# Patient Record
Sex: FEMALE | Race: WHITE | NOT HISPANIC OR LATINO
[De-identification: names, ages, dates, MRNs, and addresses within clinical notes are randomized per-mention and may not be internally consistent; named-entity substitution may affect disease eponyms.]

---

## 2017-11-12 ENCOUNTER — HEALTH MAINTENANCE LETTER (OUTPATIENT)
Age: 32
End: 2017-11-12

## 2018-07-04 ENCOUNTER — TRANSFERRED RECORDS (OUTPATIENT)
Dept: HEALTH INFORMATION MANAGEMENT | Facility: CLINIC | Age: 33
End: 2018-07-04

## 2018-07-05 ENCOUNTER — OFFICE VISIT (OUTPATIENT)
Dept: ORTHOPEDICS | Facility: OTHER | Age: 33
End: 2018-07-05
Payer: COMMERCIAL

## 2018-07-05 VITALS
BODY MASS INDEX: 24.48 KG/M2 | SYSTOLIC BLOOD PRESSURE: 104 MMHG | HEIGHT: 62 IN | WEIGHT: 133 LBS | DIASTOLIC BLOOD PRESSURE: 62 MMHG

## 2018-07-05 DIAGNOSIS — S50.11XA CONTUSION OF RIGHT FOREARM, INITIAL ENCOUNTER: Primary | ICD-10-CM

## 2018-07-05 PROCEDURE — 99213 OFFICE O/P EST LOW 20 MIN: CPT | Performed by: PHYSICAL MEDICINE & REHABILITATION

## 2018-07-05 NOTE — LETTER
"    7/5/2018         RE: Antonina Rosario  [address] [facility] [address]        Dear Colleague,    Thank you for referring your patient, Antonina Rosario, to the [facility]. Please see a copy of my visit note below.    Sports Medicine Clinic Visit    PCP: Yury Blanco    CC: Patient presents with:  Right Forearm - Edema, Pain      HPI:  Antonina Rosario is a 33 year old female who is seen as an ER referral.   She notes right dorsal mid-forearm an injury on 7/4/18 when she rolled a go cart and got her arm \"squished\" by the roll bar. Was seen at ER last night at [facility] in [place] where she received xrays and application of orthoglass splint. Was told nothing was broken and that is was likely soft tissue injury to extensor muscles and to follow-up with ortho. She rates the pain at a  10/10 at its worst and a 2/10 currently.  Symptoms are relieved with ice, Ibuprofen, rest and splint. Symptoms are worsened by finger movement, lifting, gripping, lifting. She endorses swelling, bruising and abraision.   She denies popping, grinding, catching, numbness, tingling and pain in other joints.  Other treatment has included ice, ibuprofen, rest and splint. She notes difficulty with lifting and general arm usage.       Review of Systems:  Musculoskeletal: as above  Remainder of review of systems is negative including constitutional, eyes, ENT, CV, pulmonary, GI, , endocrine, skin, hematologic, and neurologic except as noted in HPI or medical history.    History reviewed. No pertinent past surgical/medical/family/social history other than as mentioned in HPI.    Patient Active Problem List   Diagnosis     CARDIOVASCULAR SCREENING; LDL GOAL LESS THAN 160     Past Medical History:   Diagnosis Date     NO ACTIVE PROBLEMS      Past Surgical History:   Procedure Laterality Date     NO HISTORY OF SURGERY       Family History   Problem Relation Age of Onset     Cancer Paternal Grandmother      pancreatic " "    Social History     Social History     Marital status: Single     Spouse name: N/A     Number of children: N/A     Years of education: N/A     Occupational History      [employer]     Social History Main Topics     Smoking status: Current Every Day Smoker     Types: Cigarettes     Smokeless tobacco: Never Used      Comment: As of 4/2/09  reports she's smoking 4-5 cig/day     Alcohol use No     Drug use: No     Sexual activity: Yes     Partners: Male     Birth control/ protection: IUD     Other Topics Concern      Service No     Blood Transfusions No     Caffeine Concern Yes     Advised not more than 16 ounces/day     Occupational Exposure Yes          Hobby Hazards No     Sleep Concern No     Stress Concern No     Weight Concern No     Special Diet No     Back Care No     Exercise No     Advised walking 30 minutes/day     Seat Belt Yes     Parent/Sibling W/ Cabg, Mi Or Angioplasty Before 65f 55m? No     Social History Narrative    Lives in [place] with S.O., Timothy and son, Thai.  Timothy and Antonina both smoke.  No concerns about domestic violence.  No indoor cats/kittens.           Current Outpatient Prescriptions   Medication     IBUPROFEN PO     levonorgestrel (MIRENA) 20 MCG/24HR IUD     buPROPion (WELLBUTRIN SR) 100 MG 12 hr tablet     No current facility-administered medications for this visit.      Allergies   Allergen Reactions     Sulfa Drugs      Hives         Objective:  /62 (BP Location: Left arm, Patient Position: Sitting, Cuff Size: Adult Regular)  Ht 5' 2\" (1.575 m)  Wt 133 lb (60.3 kg)  BMI 24.33 kg/m2    General: Alert and in no distress    Head: Normocephalic, atraumatic  Eyes: no scleral icterus or conjunctival erythema   Oropharynx:  Mucous membranes moist  Skin: no erythema, petechiae, or jaundice  CV: regular rhythm by palpation, 2+ distal pulses  Resp: normal respiratory effort without conversational dyspnea   Psych: normal mood and affect    Neuro: Motor " strength and sensation as noted below    Musculoskeletal:    Bilateral Wrist and Hand exam    Inspection:  -Soft tissue swelling over the right forearm  -Abrasion (extensor surface) and mild bruising over the right forearm    Palpation:  -Tender over the right dorsal mid forearm.    ROM:  -Decreased and painful right wrist flexion and extension.  Mildly decreased right forearm extension    Strength:  Bilateral elbow flexion/extension, forearm supination/pronation,  strength, finger abduction 5/5.  Left wrist flexion/extension 4+/5.  Right wrist flexion/extension 5/5.    Neurovascular:       2+ radial pulses bilaterally      Altered sensation to light touch over the right dorsal mid forearm abrasion    Radiology:  Reviewed printed forearm x-ray from outside hospital which revealed no acute fractures.  Will also be obtaining results from the ER.      Assessment:  1. Contusion of right forearm, initial encounter        Plan:  Discussed the assessment with the patient and developed a plan together:  -No fractures seen on x-ray.  Suspect soft tissue injury of the extensor compartment of the forearm.    -Over the counter medication: Acetaminophen (Tylenol) 1000mg every 6 hours with food (Maximum of 3000mg/day)  Ibuprofen (Advil) maximum of 800mg three times a day with food  -Use splint and sling as needed--wean as tolerated  -Ice for 15-20 minutes multiple times a day for pain and swelling.  -Gentle pain-free wrist and elbow range of motion  -Antonina signed a release of information so we could obtain records from the ER.     -Follow up as needed if symptoms fail to improve or worsen.  Please call with questions or concerns.        Claudia Lobo MD, [facility] Sports Medicine  [facility]      Again, thank you for allowing me to participate in the care of your patient.        Sincerely,        Hanane Lobo MD

## 2018-07-05 NOTE — PATIENT INSTRUCTIONS
Today's Plan of Care:  Over the counter medication: Acetaminophen (Tylenol) 1000mg every 6 hours with food (Maximum of 3000mg/day)  Ibuprofen (Advil) maximum of 800mg three times a day with food  Use splint and sling as needed--wean as tolerated  Gentle pain-free wrist and elbow range of motion    Follow Up: as needed    If you have any further questions for your physician or physician s care team you can call [phone] and use option 3 to leave a voice message. Calls received during business hours will be returned same day.

## 2018-07-05 NOTE — MR AVS SNAPSHOT
After Visit Summary   7/5/2018    Antonina Rosario    MRN: [id number]           Patient Information     Date Of Birth          1985        Visit Information        Provider Department      7/5/2018 11:40 AM Hanane Lobo MD [facility]        Care Instructions    Today's Plan of Care:  Over the counter medication: Acetaminophen (Tylenol) 1000mg every 6 hours with food (Maximum of 3000mg/day)  Ibuprofen (Advil) maximum of 800mg three times a day with food  Use splint and sling as needed--wean as tolerated  Gentle pain-free wrist and elbow range of motion    Follow Up: as needed    If you have any further questions for your physician or physician s care team you can call [phone] and use option 3 to leave a voice message. Calls received during business hours will be returned same day.          Follow-ups after your visit        Who to contact     If you have questions or need follow up information about today's clinic visit or your schedule please contact [facility] directly at [phone].  Normal or non-critical lab and imaging results will be communicated to you by [employer], letter or phone within 4 business days after the clinic has received the results. If you do not hear from us within 7 days, please contact the clinic through [facility]t or phone. If you have a critical or abnormal lab result, we will notify you by phone as soon as possible.  Submit refill requests through [employer] or call your pharmacy and they will forward the refill request to us. Please allow 3 business days for your refill to be completed.          Additional Information About Your Visit        Veritexthart Information     [employer] gives you secure access to your electronic health record. If you see a primary care provider, you can also send messages to your care team and make appointments. If you have questions, please call your primary care clinic.  If you do not have a primary care  "provider, please call [phone] and they will assist you.        Care EveryWhere ID     This is your Care EveryWhere ID. This could be used by other organizations to access your [facility] medical records  [id number]        Your Vitals Were     Height BMI (Body Mass Index)                5' 2\" (1.575 m) 24.33 kg/m2           Blood Pressure from Last 3 Encounters:   07/05/18 104/62   04/28/16 100/62   09/11/14 122/58    Weight from Last 3 Encounters:   07/05/18 133 lb (60.3 kg)   04/28/16 127 lb (57.6 kg)   09/11/14 123 lb (55.8 kg)              Today, you had the following     No orders found for display       Primary Care Provider Office Phone # Fax #    Yury Vasquez Blanco [phone]       [address] [place]            [place] [address]        Equal Access to Services     [facility]: Hadii aad ku hadasho Soomaali, waaxda luqadaha, qaybta kaalmada adeegyada, waxay roulain haykenan lennox sheehan . So [facility] [phone].    ATENCIÓN: Si habla español, tiene a capone disposición servicios gratuitos de asistencia lingüística. Arline al [phone].    We comply with applicable federal civil rights laws and [place] laws. We do not discriminate on the basis of race, color, national origin, age, disability, sex, sexual orientation, or gender identity.            Thank you!     Thank you for choosing [facility]  for your care. Our goal is always to provide you with excellent care. Hearing back from our patients is one way we can continue to improve our services. Please take a few minutes to complete the written survey that you may receive in the mail after your visit with us. Thank you!             Your Updated Medication List - Protect others around you: Learn how to safely use, store and throw away your medicines at www.disposemymeds.org.          This list is accurate as of 7/5/18 12:12 PM.  Always use your most recent med list.                   Brand Name Dispense Instructions " for use Diagnosis    buPROPion 100 MG 12 hr tablet    WELLBUTRIN SR    60 tablet    Take 1 tablet (100 mg) by mouth 2 times daily    Encounter for tobacco use cessation counseling       IBUPROFEN PO      Take 800 mg by mouth daily as needed for moderate pain        levonorgestrel 20 MCG/24HR IUD    MIRENA    1 each    1 each (20 mcg) by Intrauterine route once

## 2018-07-05 NOTE — PROGRESS NOTES
"Sports Medicine Clinic Visit    PCP: Yury Blanco    CC: Patient presents with:  Right Forearm - Edema, Pain      HPI:  Antonina Rosario is a 33 year old female who is seen as an ER referral.   She notes right dorsal mid-forearm an injury on 7/4/18 when she rolled a go cart and got her arm \"squished\" by the roll bar. Was seen at ER last night at [facility] in [place] where she received xrays and application of orthoglass splint. Was told nothing was broken and that is was likely soft tissue injury to extensor muscles and to follow-up with ortho. She rates the pain at a  10/10 at its worst and a 2/10 currently.  Symptoms are relieved with ice, Ibuprofen, rest and splint. Symptoms are worsened by finger movement, lifting, gripping, lifting. She endorses swelling, bruising and abraision.   She denies popping, grinding, catching, numbness, tingling and pain in other joints.  Other treatment has included ice, ibuprofen, rest and splint. She notes difficulty with lifting and general arm usage.       Review of Systems:  Musculoskeletal: as above  Remainder of review of systems is negative including constitutional, eyes, ENT, CV, pulmonary, GI, , endocrine, skin, hematologic, and neurologic except as noted in HPI or medical history.    History reviewed. No pertinent past surgical/medical/family/social history other than as mentioned in HPI.    Patient Active Problem List   Diagnosis     CARDIOVASCULAR SCREENING; LDL GOAL LESS THAN 160     Past Medical History:   Diagnosis Date     NO ACTIVE PROBLEMS      Past Surgical History:   Procedure Laterality Date     NO HISTORY OF SURGERY       Family History   Problem Relation Age of Onset     Cancer Paternal Grandmother      pancreatic     Social History     Social History     Marital status: Single     Spouse name: N/A     Number of children: N/A     Years of education: N/A     Occupational History      [employer]     Social History Main Topics     Smoking " "status: Current Every Day Smoker     Types: Cigarettes     Smokeless tobacco: Never Used      Comment: As of 4/2/09  reports she's smoking 4-5 cig/day     Alcohol use No     Drug use: No     Sexual activity: Yes     Partners: Male     Birth control/ protection: IUD     Other Topics Concern      Service No     Blood Transfusions No     Caffeine Concern Yes     Advised not more than 16 ounces/day     Occupational Exposure Yes          Hobby Hazards No     Sleep Concern No     Stress Concern No     Weight Concern No     Special Diet No     Back Care No     Exercise No     Advised walking 30 minutes/day     Seat Belt Yes     Parent/Sibling W/ Cabg, Mi Or Angioplasty Before 65f 55m? No     Social History Narrative    Lives in [place] with S.O., Timothy and son, Thai.  Timothy and Antonina both smoke.  No concerns about domestic violence.  No indoor cats/kittens.           Current Outpatient Prescriptions   Medication     IBUPROFEN PO     levonorgestrel (MIRENA) 20 MCG/24HR IUD     buPROPion (WELLBUTRIN SR) 100 MG 12 hr tablet     No current facility-administered medications for this visit.      Allergies   Allergen Reactions     Sulfa Drugs      Hives         Objective:  /62 (BP Location: Left arm, Patient Position: Sitting, Cuff Size: Adult Regular)  Ht 5' 2\" (1.575 m)  Wt 133 lb (60.3 kg)  BMI 24.33 kg/m2    General: Alert and in no distress    Head: Normocephalic, atraumatic  Eyes: no scleral icterus or conjunctival erythema   Oropharynx:  Mucous membranes moist  Skin: no erythema, petechiae, or jaundice  CV: regular rhythm by palpation, 2+ distal pulses  Resp: normal respiratory effort without conversational dyspnea   Psych: normal mood and affect    Neuro: Motor strength and sensation as noted below    Musculoskeletal:    Bilateral Wrist and Hand exam    Inspection:  -Soft tissue swelling over the right forearm  -Abrasion (extensor surface) and mild bruising over the right " forearm    Palpation:  -Tender over the right dorsal mid forearm.    ROM:  -Decreased and painful right wrist flexion and extension.  Mildly decreased right forearm extension    Strength:  Bilateral elbow flexion/extension, forearm supination/pronation,  strength, finger abduction 5/5.  Left wrist flexion/extension 4+/5.  Right wrist flexion/extension 5/5.    Neurovascular:       2+ radial pulses bilaterally      Altered sensation to light touch over the right dorsal mid forearm abrasion    Radiology:  Reviewed printed forearm x-ray from outside hospital which revealed no acute fractures.  Will also be obtaining results from the ER.      Assessment:  1. Contusion of right forearm, initial encounter        Plan:  Discussed the assessment with the patient and developed a plan together:  -No fractures seen on x-ray.  Suspect soft tissue injury of the extensor compartment of the forearm.    -Over the counter medication: Acetaminophen (Tylenol) 1000mg every 6 hours with food (Maximum of 3000mg/day)  Ibuprofen (Advil) maximum of 800mg three times a day with food  -Use splint and sling as needed--wean as tolerated  -Ice for 15-20 minutes multiple times a day for pain and swelling.  -Gentle pain-free wrist and elbow range of motion  -Antonina signed a release of information so we could obtain records from the ER.     -Follow up as needed if symptoms fail to improve or worsen.  Please call with questions or concerns.        Claudia Lobo MD, CA[facility] Sports Medicine  [facility]

## 2019-11-09 ENCOUNTER — HEALTH MAINTENANCE LETTER (OUTPATIENT)
Age: 34
End: 2019-11-09

## 2020-02-23 ENCOUNTER — HEALTH MAINTENANCE LETTER (OUTPATIENT)
Age: 35
End: 2020-02-23

## 2020-12-06 ENCOUNTER — HEALTH MAINTENANCE LETTER (OUTPATIENT)
Age: 35
End: 2020-12-06

## 2021-09-26 ENCOUNTER — HEALTH MAINTENANCE LETTER (OUTPATIENT)
Age: 36
End: 2021-09-26

## 2022-01-15 ENCOUNTER — HEALTH MAINTENANCE LETTER (OUTPATIENT)
Age: 37
End: 2022-01-15

## 2023-04-23 ENCOUNTER — HEALTH MAINTENANCE LETTER (OUTPATIENT)
Age: 38
End: 2023-04-23